# Patient Record
Sex: MALE | Race: ASIAN | NOT HISPANIC OR LATINO | Employment: UNEMPLOYED
[De-identification: names, ages, dates, MRNs, and addresses within clinical notes are randomized per-mention and may not be internally consistent; named-entity substitution may affect disease eponyms.]

---

## 2024-07-27 ENCOUNTER — HEALTH MAINTENANCE LETTER (OUTPATIENT)
Age: 59
End: 2024-07-27

## 2024-09-12 ENCOUNTER — OFFICE VISIT (OUTPATIENT)
Dept: FAMILY MEDICINE | Facility: CLINIC | Age: 59
End: 2024-09-12
Payer: COMMERCIAL

## 2024-09-12 VITALS
HEART RATE: 66 BPM | SYSTOLIC BLOOD PRESSURE: 161 MMHG | BODY MASS INDEX: 29.98 KG/M2 | TEMPERATURE: 97.3 F | WEIGHT: 191 LBS | HEIGHT: 67 IN | RESPIRATION RATE: 16 BRPM | DIASTOLIC BLOOD PRESSURE: 94 MMHG | OXYGEN SATURATION: 100 %

## 2024-09-12 DIAGNOSIS — Z13.1 SCREENING FOR DIABETES MELLITUS: ICD-10-CM

## 2024-09-12 DIAGNOSIS — Z23 NEED FOR SHINGLES VACCINE: ICD-10-CM

## 2024-09-12 DIAGNOSIS — Z00.00 ROUTINE GENERAL MEDICAL EXAMINATION AT A HEALTH CARE FACILITY: Primary | ICD-10-CM

## 2024-09-12 DIAGNOSIS — E66.811 CLASS 1 OBESITY DUE TO EXCESS CALORIES WITHOUT SERIOUS COMORBIDITY WITH BODY MASS INDEX (BMI) OF 30.0 TO 30.9 IN ADULT: ICD-10-CM

## 2024-09-12 DIAGNOSIS — E11.9 TYPE 2 DIABETES MELLITUS WITHOUT COMPLICATION, WITHOUT LONG-TERM CURRENT USE OF INSULIN (H): ICD-10-CM

## 2024-09-12 DIAGNOSIS — Z11.59 NEED FOR HEPATITIS B SCREENING TEST: ICD-10-CM

## 2024-09-12 DIAGNOSIS — Z11.59 NEED FOR HEPATITIS C SCREENING TEST: ICD-10-CM

## 2024-09-12 DIAGNOSIS — Z23 NEED FOR DIPHTHERIA-TETANUS-PERTUSSIS (TDAP) VACCINE: ICD-10-CM

## 2024-09-12 DIAGNOSIS — Z11.4 SCREENING FOR HIV (HUMAN IMMUNODEFICIENCY VIRUS): ICD-10-CM

## 2024-09-12 DIAGNOSIS — Z13.220 SCREENING FOR HYPERLIPIDEMIA: ICD-10-CM

## 2024-09-12 DIAGNOSIS — R74.8 ELEVATED LIVER ENZYMES: ICD-10-CM

## 2024-09-12 DIAGNOSIS — Z76.89 ENCOUNTER TO ESTABLISH CARE: ICD-10-CM

## 2024-09-12 DIAGNOSIS — E66.09 CLASS 1 OBESITY DUE TO EXCESS CALORIES WITHOUT SERIOUS COMORBIDITY WITH BODY MASS INDEX (BMI) OF 30.0 TO 30.9 IN ADULT: ICD-10-CM

## 2024-09-12 DIAGNOSIS — Z12.11 SCREEN FOR COLON CANCER: ICD-10-CM

## 2024-09-12 DIAGNOSIS — R03.0 ELEVATED BP WITHOUT DIAGNOSIS OF HYPERTENSION: ICD-10-CM

## 2024-09-12 LAB
ALBUMIN SERPL BCG-MCNC: 4.7 G/DL (ref 3.5–5.2)
ALP SERPL-CCNC: 78 U/L (ref 40–150)
ALT SERPL W P-5'-P-CCNC: 97 U/L (ref 0–70)
ANION GAP SERPL CALCULATED.3IONS-SCNC: 8 MMOL/L (ref 7–15)
AST SERPL W P-5'-P-CCNC: 65 U/L (ref 0–45)
BILIRUB SERPL-MCNC: 0.6 MG/DL
BUN SERPL-MCNC: 17.2 MG/DL (ref 6–20)
CALCIUM SERPL-MCNC: 9.3 MG/DL (ref 8.8–10.4)
CHLORIDE SERPL-SCNC: 103 MMOL/L (ref 98–107)
CHOLEST SERPL-MCNC: 266 MG/DL
CREAT SERPL-MCNC: 0.88 MG/DL (ref 0.67–1.17)
EGFRCR SERPLBLD CKD-EPI 2021: >90 ML/MIN/1.73M2
FASTING STATUS PATIENT QL REPORTED: YES
FASTING STATUS PATIENT QL REPORTED: YES
GLUCOSE SERPL-MCNC: 126 MG/DL (ref 70–99)
HBA1C MFR BLD: 6.5 % (ref 0–5.6)
HBV SURFACE AB SERPL IA-ACNC: 718 M[IU]/ML
HBV SURFACE AB SERPL IA-ACNC: REACTIVE M[IU]/ML
HCO3 SERPL-SCNC: 28 MMOL/L (ref 22–29)
HCV AB SERPL QL IA: NONREACTIVE
HDLC SERPL-MCNC: 66 MG/DL
HIV 1+2 AB+HIV1 P24 AG SERPL QL IA: NONREACTIVE
LDLC SERPL CALC-MCNC: 174 MG/DL
NONHDLC SERPL-MCNC: 200 MG/DL
POTASSIUM SERPL-SCNC: 4.7 MMOL/L (ref 3.4–5.3)
PROT SERPL-MCNC: 8.1 G/DL (ref 6.4–8.3)
SODIUM SERPL-SCNC: 139 MMOL/L (ref 135–145)
TRIGL SERPL-MCNC: 130 MG/DL

## 2024-09-12 PROCEDURE — 80061 LIPID PANEL: CPT | Performed by: STUDENT IN AN ORGANIZED HEALTH CARE EDUCATION/TRAINING PROGRAM

## 2024-09-12 PROCEDURE — 87389 HIV-1 AG W/HIV-1&-2 AB AG IA: CPT | Performed by: STUDENT IN AN ORGANIZED HEALTH CARE EDUCATION/TRAINING PROGRAM

## 2024-09-12 PROCEDURE — 86706 HEP B SURFACE ANTIBODY: CPT | Performed by: STUDENT IN AN ORGANIZED HEALTH CARE EDUCATION/TRAINING PROGRAM

## 2024-09-12 PROCEDURE — 36415 COLL VENOUS BLD VENIPUNCTURE: CPT | Performed by: STUDENT IN AN ORGANIZED HEALTH CARE EDUCATION/TRAINING PROGRAM

## 2024-09-12 PROCEDURE — 82728 ASSAY OF FERRITIN: CPT | Performed by: STUDENT IN AN ORGANIZED HEALTH CARE EDUCATION/TRAINING PROGRAM

## 2024-09-12 PROCEDURE — 83036 HEMOGLOBIN GLYCOSYLATED A1C: CPT | Performed by: STUDENT IN AN ORGANIZED HEALTH CARE EDUCATION/TRAINING PROGRAM

## 2024-09-12 PROCEDURE — 86803 HEPATITIS C AB TEST: CPT | Performed by: STUDENT IN AN ORGANIZED HEALTH CARE EDUCATION/TRAINING PROGRAM

## 2024-09-12 PROCEDURE — 80053 COMPREHEN METABOLIC PANEL: CPT | Performed by: STUDENT IN AN ORGANIZED HEALTH CARE EDUCATION/TRAINING PROGRAM

## 2024-09-12 PROCEDURE — 90750 HZV VACC RECOMBINANT IM: CPT | Performed by: STUDENT IN AN ORGANIZED HEALTH CARE EDUCATION/TRAINING PROGRAM

## 2024-09-12 PROCEDURE — 99213 OFFICE O/P EST LOW 20 MIN: CPT | Mod: 25 | Performed by: STUDENT IN AN ORGANIZED HEALTH CARE EDUCATION/TRAINING PROGRAM

## 2024-09-12 PROCEDURE — 90472 IMMUNIZATION ADMIN EACH ADD: CPT | Performed by: STUDENT IN AN ORGANIZED HEALTH CARE EDUCATION/TRAINING PROGRAM

## 2024-09-12 PROCEDURE — 90471 IMMUNIZATION ADMIN: CPT | Performed by: STUDENT IN AN ORGANIZED HEALTH CARE EDUCATION/TRAINING PROGRAM

## 2024-09-12 PROCEDURE — 90715 TDAP VACCINE 7 YRS/> IM: CPT | Performed by: STUDENT IN AN ORGANIZED HEALTH CARE EDUCATION/TRAINING PROGRAM

## 2024-09-12 PROCEDURE — 99386 PREV VISIT NEW AGE 40-64: CPT | Mod: 25 | Performed by: STUDENT IN AN ORGANIZED HEALTH CARE EDUCATION/TRAINING PROGRAM

## 2024-09-12 NOTE — PROGRESS NOTES
Preventive Care Visit  [facility]  Jaylin Cintron MD, Family Medicine  Sep 12, 2024      Thiago was seen today for physical.    Diagnoses and all orders for this visit:    Routine general medical examination at a health care facility  Encounter to establish care  -     PRIMARY CARE FOLLOW-UP SCHEDULING; Future  -     REVIEW OF HEALTH MAINTENANCE PROTOCOL ORDERS    Elevated BP without diagnosis of hypertension  Asymptomatic.  Patient has blood pressure cuff at home.  Reports that systolic blood pressures vary from 120s to 140s at home.  Will check amatory blood pressures for the next month and bring to next visit.  -     Comprehensive metabolic panel (BMP + Alb, Alk Phos, ALT, AST, Total. Bili, TP); Future  -     Comprehensive metabolic panel (BMP + Alb, Alk Phos, ALT, AST, Total. Bili, TP)    Class 1 obesity due to excess calories without serious comorbidity with body mass index (BMI) of 30.0 to 30.9 in adult  Discussed Lifestyle modifications, including: Increasing intake of vegetables and fruits, decreasing intake of processed foods/carbohydrates/sugars, having regular physical activity, and losing weight.    Screen for colon cancer  Discussed options, patient would like to do fit test.  -     Fecal colorectal cancer screen (FIT); Future    Screening for HIV (human immunodeficiency virus)  -     HIV Antigen Antibody Combo; Future  -     HIV Antigen Antibody Combo    Need for hepatitis C screening test  -     Hepatitis C Screen Reflex to HCV RNA Quant and Genotype; Future  -     Hepatitis C Screen Reflex to HCV RNA Quant and Genotype    Need for diphtheria-tetanus-pertussis (Tdap) vaccine  -     TDAP 10-64Y (ADACEL,BOOSTRIX)    Screening for hyperlipidemia  -     Lipid panel reflex to direct LDL Non-fasting; Future  -     Lipid panel reflex to direct LDL Non-fasting    Screening for diabetes mellitus  -     Hemoglobin A1c; Future  -     Hemoglobin A1c    Need for shingles vaccine  -     ZOSTER  RECOMBINANT ADJUVANTED (SHINGRIX)    Need for hepatitis B screening test  -     Hepatitis B Surface Antibody; Future  -     Hepatitis B Surface Antibody          Opal Das is a 59 year old, presenting for the following:  Physical (No concerns, est care)         Health Care Directive  Patient does not have a Health Care Directive or Living Will: Discussed advance care planning with patient; information given to patient to review.    HPI        9/12/2024   General Health   How would you rate your overall physical health? Good   Feel stress (tense, anxious, or unable to sleep) Not at all            9/12/2024   Nutrition   Three or more servings of calcium each day? (!) I DON'T KNOW   Diet: Regular (no restrictions)   How many servings of fruit and vegetables per day? (!) 2-3   How many sweetened beverages each day? 0-1            9/12/2024   Exercise   Days per week of moderate/strenous exercise 1 day   Average minutes spent exercising at this level 30 min      (!) EXERCISE CONCERN      9/12/2024   Social Factors   Frequency of gathering with friends or relatives Once a week   Worry food won't last until get money to buy more No   Food not last or not have enough money for food? No   Do you have housing? (Housing is defined as stable permanent housing and does not include staying ouside in a car, in a tent, in an abandoned building, in an overnight shelter, or couch-surfing.) Yes   Are you worried about losing your housing? No   Lack of transportation? No   Unable to get utilities (heat,electricity)? No            9/12/2024   Fall Risk   Fallen 2 or more times in the past year? No   Trouble with walking or balance? No             9/12/2024   Dental   Dentist two times every year? (!) NO            9/12/2024   TB Screening   Were you born outside of the [place]? Yes            Today's PHQ-2 Score:       9/12/2024     8:42 AM   PHQ-2 ( 1999 Pfizer)   Q1: Little interest or pleasure in doing things 0   Q2: Feeling  "down, depressed or hopeless 0   PHQ-2 Score 0   Q1: Little interest or pleasure in doing things Not at all   Q2: Feeling down, depressed or hopeless Not at all   PHQ-2 Score 0           9/12/2024   Substance Use   Alcohol more than 3/day or more than 7/wk No   Do you use any other substances recreationally? No        Social History     Tobacco Use    Smoking status: Never     Passive exposure: Never    Smokeless tobacco: Never   Vaping Use    Vaping status: Never Used             9/12/2024   One time HIV Screening   Previous HIV test? No          9/12/2024   STI Screening   New sexual partner(s) since last STI/HIV test? No      Last PSA: No results found for: \"PSA\"  ASCVD Risk   The ASCVD Risk score (Osman MENJIVAR, et al., 2019) failed to calculate for the following reasons:    Cannot find a previous HDL lab    Cannot find a previous total cholesterol lab          Reviewed and updated as needed this visit by Provider                             Objective    Exam  BP (!) 161/94   Pulse 66   Temp 97.3  F (36.3  C) (Temporal)   Resp 16   Ht 1.69 m (5' 6.54\")   Wt 86.6 kg (191 lb)   SpO2 100%   BMI 30.33 kg/m     Estimated body mass index is 30.33 kg/m  as calculated from the following:    Height as of this encounter: 1.69 m (5' 6.54\").    Weight as of this encounter: 86.6 kg (191 lb).    Physical Exam  General Appearance:  Alert, cooperative, no distress, appears stated age.  Head:  Normocephalic, without obvious abnormality, atraumatic.  Eyes:  Conjunctivae/corneas clear, extraocular movements intact both eyes.  Lungs:  Clear to auscultation bilaterally, respirations unlabored.  Heart:  Regular rate and rhythm, S1 and S2 normal, no murmur, rub or gallop.  Abdomen:  Soft, non-tender, bowel sounds active all four quadrants.   Extremities:  Atraumatic, no cyanosis or edema.  Skin:  Skin color, texture, turgor normal, no rashes or lesions.  Neurologic: No focal deficits.          Signed Electronically by: Jaylin" MD Abdulaziz    Prior to immunization administration, verified patients identity using patient s name and date of birth. Please see Immunization Activity for additional information.     Screening Questionnaire for Adult Immunization    Are you sick today?   No   Do you have allergies to medications, food, a vaccine component or latex?   No   Have you ever had a serious reaction after receiving a vaccination?   No   Do you have a long-term health problem with heart, lung, kidney, or metabolic disease (e.g., diabetes), asthma, a blood disorder, no spleen, complement component deficiency, a cochlear implant, or a spinal fluid leak?  Are you on long-term aspirin therapy?   No   Do you have cancer, leukemia, HIV/AIDS, or any other immune system problem?   No   Do you have a parent, brother, or sister with an immune system problem?   No   In the past 3 months, have you taken medications that affect  your immune system, such as prednisone, other steroids, or anticancer drugs; drugs for the treatment of rheumatoid arthritis, Crohn s disease, or psoriasis; or have you had radiation treatments?   No   Have you had a seizure, or a brain or other nervous system problem?   No   During the past year, have you received a transfusion of blood or blood    products, or been given immune (gamma) globulin or antiviral drug?   No   For women: Are you pregnant or is there a chance you could become       pregnant during the next month?   No   Have you received any vaccinations in the past 4 weeks?   No     Immunization questionnaire answers were all negative.      Patient instructed to remain in clinic for 15 minutes afterwards, and to report any adverse reactions.     Screening performed by Ross Tobias MA on 9/12/2024 at 8:51 AM.

## 2024-09-12 NOTE — PATIENT INSTRUCTIONS
"Patient Education   Sharon Nichelle Xeeb Saib Xyuas Kom Tiv Thaiv Tus Kheej  Nov yog ib co sharon nichelle xeeb uas peb yeej meem muab david tib neeg pab lawv noj qab nyob zoo. Tony zaum koj pab pawg saib xyuas yuav muaj ib co sharon nichelle xeeb uas tshwj xeeb david koj nkaus xwb. Thov nrog koj pab pawg saib xyuas sib june txog tony yam uas koj toob rojas kom tiv thaiv koj tus kheej.  Lukas Coj Lub Neej  Es xaws xais ntau edmar 150 feeb txhua lub moffett tiam (30 feeb txhua hnub, 5 hnub ib lub moffett tiam).  Ua yam pab cov leeg muaj zog tuaj 2 zaug txhua lub moffett tiam. Tony yam no pab koj tswj hwm koj lub cev qhov hnyav thiab tiv thaiv ntawm kab mob.  Txhob haus luam yeeb.  Pleev tshuaj tiv thaiv tshav kub kom thiaj tsis raug mob khees xaws ntawm nqaij tawv.  Txhua 2 mus david 5 xyoos yuav tau kuaj koj lub tsev david qhov radon. Radon yog ib rozina vicky uas tsis muaj xim tsis muaj ntxhiab uas mob tau koj cov ntsws. Kom thiaj kawm ntxiv, mus saib [email or web address] thiab ntaus ntawv \"Radon in Homes.\"  Ceev cov phom kom tsis muaj mos txwv david hauv thiab muab xauv porfirio hauv ib qho chaw nyab xeeb xws li lub txhoj, los yog muab xauv porfirio thiab muab cov yawm sij zais porfirio. Muab cov mos txwv xauv david hauv lwm qhov chaw nrug cov phom. Kom thiaj kawm ntxiv, mus saib [email or web address] thiab ntaus ntawv \"safe gun storage.\"  Lukas Noj Qab Huv  Noj 5 qho txiv hmab txiv ntoo thiab zaub txhua hnub.  Noj nplem nplej, txhuv xim av thiab cov fawm muaj nplej (los theej nplem dawb, txhuv dawb, thiab fawm dawb).  Ua tib zoo noj calcium thiab vitamin D ntau. Saib cov ntawv lo david pob khoom noj thiab siv zog noj kom txog 100% RDA (qhov ntau hauv ib hnub).  Yeej meem kuaj ntsuas  Txhua 6 lub hli mus kuaj hniav thiab muab tu.  Txhua xyoo mus saib koj pab pawg saib xyuas lukas noj qab nyob zoo kom sib june txog:  Ib yam dab tsi uas hloov ntawm koj txoj lukas noj qab nyob zoo.  Cov tshuaj uas koj pab pawg tau hais kom siv.  Lukas saib xyuas kom tiv thaiv, lukas npaj david tsev neeg, thiab yuav ua li chrissie tiv " thaiv ntawm kab mob uas ntev.  Lukas txhaj tshuaj (koob tshuaj tiv thaiv)   Cov koob tshuaj HPV (txog thaum muaj 26 xyoo), yog koj yeej tsis tau txais edmar li.  Cov koob tshuaj Hepatitis B Kab Mob Siab (txog thaum muaj 61 xyoos), yog koj yeej tsis tau txais edmar li.  Koob tshuaj COVID-19: Txais koob tshuaj no thaum txog caij txais.  Koob tshuaj tiv thaiv ntawm mob khaub thuas: Txais txhua xyoo.  Koob tshuaj tiv thaiv mob daig tsaig: Txais txhua 10 xyoo.  Pneumococcal, hepatitis A, thiab RSV cov koob tshuaj: Nug koj pab pawg saib xyuas farhad puas tsim nyog david koj txais cov no raws li koj qhov pheej hmoo.  Koob tshuaj tiv thaiv ntawm kab mob sawv hlwv (david cov muaj 50 xyoo rov giorgio).  Lukas kuaj ntsuas david lukas noj qab nyob zoo  Kuaj mob ntshav qab zib:  Pib thaum muaj 35 xyoos, Mus kuaj mob ntshav qab zib txhua 3 xyoos los yog ntau zog.  Yog koj tseem tsis tau txog 37 xyoos, nug koj pab pawg saib xyuas farhad puas tsim nyog david koj kuaj mob ntshav qab zib.  Kuaj cholesterol: Thaum muaj 39 xyoo, pib kuaj cholesterol txhua 5 xyoos, los yog ntau edmar ntawd yog kws nohelia mob qhia.  Kuaj txha qhov tuab (DEXA): Thaum txog 50 xyoo lawd, nug koj pab pawg saib xyuas farhad puas tsim nyog david koj kuaj txha farhad puas ruaj.  Kab Mob Siab Hepatitis C: Kuaj ib zaug hauv koj lub neej.  Kuaj Txoj Hlab Ntshav Hauv Plab: Nrog koj tus kws nohelia mob june txog lukas kuaj ntsuas no yog koj:  Tau haus luam yeeb ib zaug li; thiab  Yog txiv neej; thiab  Nruab hnub nyoog 65 thiab 75.  Mob Rojas Cees (kis mob dhau ntawm lukas sib deev)  Ua ntej muaj 24 xyoos: Nug koj pab pawg saib xyuas farhad puas tsim nyog kuaj david mob rojas cees.  Blu qab muaj 24 xyoos: Mus kuaj david mob rojas cees yog koj muaj lukas pheej hmoo. Koj muaj lukas pheej hmoo david mob rojas cees (suav nrog HIV) yog:  Koj sib deev nrog ntau edmar ib tug neeg.  Koj tsis siv cov hnab looj qau thaum sib deev.  Koj los yog koj tus khub deev kuaj pom tias muaj mob rojas cees lawm.  Yog koj muaj lukas pheej hmoo david mob rojas cees  HIV, nug txog cov tshuaj PrEP kom tiv thaiv ntawm HIV.  Mus kuaj david mob rojas cees HIV yam ntau edmar ib zaug hauv koj lub neej, txawm yog koj muaj lukas pheej hmoo david mob rojas cees HIV los tsis muaj los xij.  Kuaj david mob khees xaws  Kuaj lub ncauj tsev me nyuam david mob khees xaws: Yog koj muaj ib lub ncauj tsev me nyuam, aaron yuav tau ib sij kuaj lub ncauj tsev me nyuam seb puas muaj mob khees xaws pib thaum muaj 21 xyoos. Neeg feem coob uas ib sij kuaj lub ncauj tsev me nyuam thiab tsis pom dab tsi txawv lawv tsum tau solo qab muaj 65 xyoos. Nrog koj tus kws nohelia mob sib june txog qhov no.  Kuaj lub mis david mob khees xaws (mammogram): Yog koj tau muaj mis edmar li, yuav tau ib sij kuaj lub mis pib thaum muaj 40 xyoo. Lukas kuaj no yog kom saib seb puas muaj mob khees xaws hauv lub mis.  Kuaj Txoj Hnyuv David Mob Khees Xaws: Yeej tseem ceeb pib kuaj txoj hnyuv david mob khees xaws pib thaum muaj 45 xyoos.  Txhua 10 xyoo yuav tau kuaj txoj hnyuv (los yog ntau zog yog koj muaj lukas pheej hmoo) Los sis, nug koj tus kws nohelia mob txog lukas kuaj thooj quav FIT txhua xyoo los yog Cologuard txhua 3 xyoos.  Kom thiaj kawm ntxiv txog tony rozina kuaj no, mus saib: [email or web address].  Yog xav tau lukas pab txog qhov no, mus saib: [email or web address].[email or web address].  Kuaj lub christian kua phev (prostate) david mob khees xaws: Yog koj muaj ib lub christian kua phev (prostate) thiab nruab hnub nyoog 57 mus david 69, nug koj tus kws nohelia mob farhad puas tsim nyog david koj kuaj lub christian kua phev.  Kuaj ntsws david mob khees xaws: Yog koj haus luam yeeb parrish sim no los yog tau haus yav tas los uas muaj hnub nyoog nruab 50 xyoos mus david 80 xyoo, nug koj pab pawg saib xyuas farhad puas tsim nyog david koj yeej meem kuaj lub ntsws david mob khees xaws.    David cov rozina phiaj lukas siv ua ntaub ntawv qhia nkaus xwb. Yuav tsis pauv lukas qhia los ntawm koj qhov chaw nohelia mob. Copyright (harvey mitchell)   2023 [facility].   Txhua txoj paula raug tswj tseg lawm. Tshaj xyuas los ntawm [facility]. [employer] [id number] - REV 04/24.

## 2024-09-16 ENCOUNTER — LAB (OUTPATIENT)
Dept: LAB | Facility: CLINIC | Age: 59
End: 2024-09-16
Payer: COMMERCIAL

## 2024-09-16 DIAGNOSIS — Z12.11 SCREEN FOR COLON CANCER: ICD-10-CM

## 2024-09-16 PROBLEM — E78.2 MIXED HYPERLIPIDEMIA: Status: ACTIVE | Noted: 2024-09-16

## 2024-09-16 LAB — FERRITIN SERPL-MCNC: 773 NG/ML (ref 31–409)

## 2024-09-16 PROCEDURE — 82274 ASSAY TEST FOR BLOOD FECAL: CPT

## 2024-09-17 ENCOUNTER — APPOINTMENT (OUTPATIENT)
Dept: INTERPRETER SERVICES | Facility: CLINIC | Age: 59
End: 2024-09-17
Payer: COMMERCIAL

## 2024-09-17 ENCOUNTER — TELEPHONE (OUTPATIENT)
Dept: FAMILY MEDICINE | Facility: CLINIC | Age: 59
End: 2024-09-17
Payer: COMMERCIAL

## 2024-09-17 DIAGNOSIS — R74.8 ELEVATED LIVER ENZYMES: Primary | ICD-10-CM

## 2024-09-17 DIAGNOSIS — E78.5 HYPERLIPIDEMIA, UNSPECIFIED HYPERLIPIDEMIA TYPE: ICD-10-CM

## 2024-09-17 NOTE — TELEPHONE ENCOUNTER
Called patient and relayed provider message.   Patient agrees to starting cholesterol medication, low-dose aspirin, liver US.  Patient bought a BP monitor, has been taking blood pressure at home.  Readings are 120s-150s/90s-100s  Discussed proper technique--patient will implement this, continue recording daily BP and bring to 10/11 appt with Dr. Cintron    Triage - Please call to notify patient when medications have been sent. OK to [facility] at [phone].    [facility] : Ana Calloway RN  [facility]

## 2024-09-17 NOTE — TELEPHONE ENCOUNTER
----- Message from Jaylinamarilys Cintron sent at 9/16/2024  6:07 PM CDT -----  Your cholesterol levels are high.  Your risk of having a heart attack or stroke in the next 10 years is 19%, which is very high.  I recommend that we start you on a daily cholesterol medication and baby aspirin.  You can lower your risk of heart attack or stroke in the future by having better control of your blood pressure and keeping your diabetes well-controlled.    Diabetes is well-controlled.    Electrolytes and kidney function are normal.  Your liver enzymes are a little high.  I recommend we do an abdominal ultrasound to look at your liver.    Screening for hepatitis B shows that you are immune.  This may be due to vaccination in the past or previous infection that has recovered.    Screening for HIV and hepatitis C were all negative.

## 2024-09-19 LAB — HEMOCCULT STL QL IA: NEGATIVE

## 2024-09-26 RX ORDER — ASPIRIN 81 MG/1
81 TABLET ORAL DAILY
Qty: 90 TABLET | Refills: 3 | Status: SHIPPED | OUTPATIENT
Start: 2024-09-26

## 2024-09-26 RX ORDER — ATORVASTATIN CALCIUM 40 MG/1
40 TABLET, FILM COATED ORAL DAILY
Qty: 90 TABLET | Refills: 3 | Status: SHIPPED | OUTPATIENT
Start: 2024-09-26

## 2024-09-26 NOTE — TELEPHONE ENCOUNTER
Liver ultrasound, aspirin, statin ordered.  Please call to notify patient.  He should get a call in 1 or 2 days to schedule ultrasound.

## 2024-09-27 NOTE — TELEPHONE ENCOUNTER
[place] [facility] [place] Relayed clinician's message below. Pt verbalized understanding and in agreement with plan.    Lefty BECKFORD RN  [facility]

## 2024-10-11 ENCOUNTER — HOSPITAL ENCOUNTER (OUTPATIENT)
Dept: ULTRASOUND IMAGING | Facility: HOSPITAL | Age: 59
Discharge: HOME | End: 2024-10-11
Attending: STUDENT IN AN ORGANIZED HEALTH CARE EDUCATION/TRAINING PROGRAM | Admitting: STUDENT IN AN ORGANIZED HEALTH CARE EDUCATION/TRAINING PROGRAM
Payer: COMMERCIAL

## 2024-10-11 ENCOUNTER — OFFICE VISIT (OUTPATIENT)
Dept: FAMILY MEDICINE | Facility: CLINIC | Age: 59
End: 2024-10-11
Payer: COMMERCIAL

## 2024-10-11 VITALS
HEART RATE: 65 BPM | TEMPERATURE: 98 F | OXYGEN SATURATION: 100 % | DIASTOLIC BLOOD PRESSURE: 96 MMHG | SYSTOLIC BLOOD PRESSURE: 156 MMHG | WEIGHT: 195 LBS | RESPIRATION RATE: 16 BRPM | BODY MASS INDEX: 32.49 KG/M2 | HEIGHT: 65 IN

## 2024-10-11 DIAGNOSIS — R74.8 ELEVATED LIVER ENZYMES: ICD-10-CM

## 2024-10-11 DIAGNOSIS — R03.0 ELEVATED BP WITHOUT DIAGNOSIS OF HYPERTENSION: Primary | ICD-10-CM

## 2024-10-11 DIAGNOSIS — Z23 NEED FOR COVID-19 VACCINE: ICD-10-CM

## 2024-10-11 DIAGNOSIS — Z23 NEEDS FLU SHOT: ICD-10-CM

## 2024-10-11 DIAGNOSIS — Z23 NEED FOR PROPHYLACTIC VACCINATION AGAINST STREPTOCOCCUS PNEUMONIAE (PNEUMOCOCCUS): ICD-10-CM

## 2024-10-11 DIAGNOSIS — E78.2 MIXED HYPERLIPIDEMIA: ICD-10-CM

## 2024-10-11 DIAGNOSIS — E11.9 TYPE 2 DIABETES MELLITUS WITHOUT COMPLICATION, WITHOUT LONG-TERM CURRENT USE OF INSULIN (H): ICD-10-CM

## 2024-10-11 DIAGNOSIS — Z11.59 NEED FOR HEPATITIS B SCREENING TEST: ICD-10-CM

## 2024-10-11 LAB
CREAT UR-MCNC: 94.4 MG/DL
HBV CORE AB SERPL QL IA: REACTIVE
HBV SURFACE AB SERPL IA-ACNC: 631 M[IU]/ML
HBV SURFACE AB SERPL IA-ACNC: REACTIVE M[IU]/ML
MICROALBUMIN UR-MCNC: 42.5 MG/L
MICROALBUMIN/CREAT UR: 45.02 MG/G CR (ref 0–17)

## 2024-10-11 PROCEDURE — 76705 ECHO EXAM OF ABDOMEN: CPT

## 2024-10-11 PROCEDURE — 82570 ASSAY OF URINE CREATININE: CPT | Performed by: STUDENT IN AN ORGANIZED HEALTH CARE EDUCATION/TRAINING PROGRAM

## 2024-10-11 PROCEDURE — 86706 HEP B SURFACE ANTIBODY: CPT | Performed by: STUDENT IN AN ORGANIZED HEALTH CARE EDUCATION/TRAINING PROGRAM

## 2024-10-11 PROCEDURE — 86704 HEP B CORE ANTIBODY TOTAL: CPT | Performed by: STUDENT IN AN ORGANIZED HEALTH CARE EDUCATION/TRAINING PROGRAM

## 2024-10-11 PROCEDURE — 90480 ADMN SARSCOV2 VAC 1/ONLY CMP: CPT | Performed by: STUDENT IN AN ORGANIZED HEALTH CARE EDUCATION/TRAINING PROGRAM

## 2024-10-11 PROCEDURE — 99214 OFFICE O/P EST MOD 30 MIN: CPT | Performed by: STUDENT IN AN ORGANIZED HEALTH CARE EDUCATION/TRAINING PROGRAM

## 2024-10-11 PROCEDURE — G2211 COMPLEX E/M VISIT ADD ON: HCPCS | Performed by: STUDENT IN AN ORGANIZED HEALTH CARE EDUCATION/TRAINING PROGRAM

## 2024-10-11 PROCEDURE — 36415 COLL VENOUS BLD VENIPUNCTURE: CPT | Performed by: STUDENT IN AN ORGANIZED HEALTH CARE EDUCATION/TRAINING PROGRAM

## 2024-10-11 PROCEDURE — 82043 UR ALBUMIN QUANTITATIVE: CPT | Performed by: STUDENT IN AN ORGANIZED HEALTH CARE EDUCATION/TRAINING PROGRAM

## 2024-10-11 PROCEDURE — 91320 SARSCV2 VAC 30MCG TRS-SUC IM: CPT | Performed by: STUDENT IN AN ORGANIZED HEALTH CARE EDUCATION/TRAINING PROGRAM

## 2024-10-11 PROCEDURE — 87517 HEPATITIS B DNA QUANT: CPT | Performed by: STUDENT IN AN ORGANIZED HEALTH CARE EDUCATION/TRAINING PROGRAM

## 2024-10-11 NOTE — PROGRESS NOTES
Thiago was seen today for hypertension.    Diagnoses and all orders for this visit:    Elevated BP without diagnosis of hypertension  Comments:  BP elevated again, ambulatory BPs WNL.  Will have patient RTC for RN visit with ambulatory readings and machine for recheck.    Type 2 diabetes mellitus without complication, without long-term current use of insulin (H)  Comments:  Newly diagnosed.  Start metformin.  Refer to diabetes educator.  Recheck 3 months.  Orders:  -     Albumin Random Urine Quantitative with Creat Ratio; Future  -     OPTOMETRY REFERRAL; Future  -     metFORMIN (GLUCOPHAGE) 500 MG tablet; Take 2 tablets (1,000 mg) by mouth 2 times daily (with meals).  -     Adult Diabetes Education  Referral; Future  -     Albumin Random Urine Quantitative with Creat Ratio    Mixed hyperlipidemia  Comments:  Statin started 9/2024, recheck 12/2024.    Need for hepatitis B screening test  Comments:  Hep B antibody positive.  Unclear if this is from immunization or prior infection.  Will check hep B core antibody and hep B virus DNA.  Orders:  -     Hepatitis B core antibody; Future  -     Hep B Virus DNA Quant Real Time PCR; Future  -     Hepatitis B core antibody  -     Hep B Virus DNA Quant Real Time PCR    Elevated liver enzymes  Comments:  Elevated liver enzymes, positive hep B surface, check DNA and core, ultrasound ordered.  Not completed yet.  Hep C negative.  Ferritin high at 773.    Need for COVID-19 vaccine  -     COVID-19 12+ (PFIZER)    Needs flu shot  Need for prophylactic vaccination against Streptococcus pneumoniae (pneumococcus)  Comments:  Discussed, declined.      The longitudinal plan of care for the diagnosis(es)/condition(s) as documented were addressed during this visit. Due to the added complexity in care, I will continue to support Thiago in the subsequent management and with ongoing continuity of care.      The longitudinal plan of care for the diagnosis(es)/condition(s) as documented  "were addressed during this visit. Due to the added complexity in care, I will continue to support Thiago in the subsequent management and with ongoing continuity of care.        Subjective   Thiago is a 59 year old, presenting for the following health issues:  Hypertension        10/11/2024    11:06 AM   Additional Questions   Roomed by JOSE R CONTEH   Accompanied by SELF     History of Present Illness       Hypertension: He presents for follow up of hypertension.  He does check blood pressure  regularly outside of the clinic. Outside blood pressures have been over 140/90. He does not follow a low salt diet.     He eats 2-3 servings of fruits and vegetables daily.He consumes 1 sweetened beverage(s) daily.He exercises with enough effort to increase his heart rate 30 to 60 minutes per day.  He exercises with enough effort to increase his heart rate 4 days per week.   He is taking medications regularly.    HTN  BP Readings from Last 3 Encounters:   10/11/24 (!) 156/96   09/12/24 (!) 161/94   Not on medication  Reports that ambulatory Bps have been normal.     DM2 -newly diagnosed  Lab Results   Component Value Date    A1C 6.5 09/12/2024   Not on medication    HLD  Statin started 9/2024, recheck 12/2024.  The 10-year ASCVD risk score (Osman DK, et al., 2019) is: 18.5%    Values used to calculate the score:      Age: 59 years      Sex: Male      Is Non- : No      Diabetic: Yes      Tobacco smoker: No      Systolic Blood Pressure: 156 mmHg      Is BP treated: No      HDL Cholesterol: 66 mg/dL      Total Cholesterol: 266 mg/dL            Objective    BP (!) 156/96 (BP Location: Left arm, Patient Position: Sitting, Cuff Size: Adult Regular)   Pulse 65   Temp 98  F (36.7  C) (Oral)   Resp 16   Ht 1.65 m (5' 4.96\")   Wt 88.5 kg (195 lb)   SpO2 100%   BMI 32.49 kg/m    Body mass index is 32.49 kg/m .  Physical Exam   General: Well developed, well nourished.  Skin:  Dry without rash.    Head:  " Normocephalic-atraumatic.    Eye:  Normal conjunctivae.     Respiratory:  Normal respiratory effort.   Gastrointestinal:  Non-distended.    Musculoskeletal:  No deformity or edema.  Neurologic: No focal deficits.          Signed Electronically by: Jaylin Cintron MD Prior to immunization administration, verified patients identity using patient s name and date of birth. Please see Immunization Activity for additional information.     Screening Questionnaire for Adult Immunization    Are you sick today?   No   Do you have allergies to medications, food, a vaccine component or latex?   No   Have you ever had a serious reaction after receiving a vaccination?   No   Do you have a long-term health problem with heart, lung, kidney, or metabolic disease (e.g., diabetes), asthma, a blood disorder, no spleen, complement component deficiency, a cochlear implant, or a spinal fluid leak?  Are you on long-term aspirin therapy?   No   Do you have cancer, leukemia, HIV/AIDS, or any other immune system problem?   No   Do you have a parent, brother, or sister with an immune system problem?   No   In the past 3 months, have you taken medications that affect  your immune system, such as prednisone, other steroids, or anticancer drugs; drugs for the treatment of rheumatoid arthritis, Crohn s disease, or psoriasis; or have you had radiation treatments?   No   Have you had a seizure, or a brain or other nervous system problem?   No   During the past year, have you received a transfusion of blood or blood    products, or been given immune (gamma) globulin or antiviral drug?   No   For women: Are you pregnant or is there a chance you could become       pregnant during the next month?   No   Have you received any vaccinations in the past 4 weeks?   Yes     Immunization questionnaire was positive for at least one answer.  Notified       Patient instructed to remain in clinic for 15 minutes afterwards, and to report any adverse reactions.      Screening performed by Ethan Vee MA on 10/11/2024 at 11:10 AM.

## 2024-10-14 ENCOUNTER — TELEPHONE (OUTPATIENT)
Dept: FAMILY MEDICINE | Facility: CLINIC | Age: 59
End: 2024-10-14
Payer: COMMERCIAL

## 2024-10-14 LAB — HBV DNA SERPL NAA+PROBE-ACNC: NOT DETECTED IU/ML

## 2024-10-14 NOTE — TELEPHONE ENCOUNTER
----- Message from Jaylin Cintron sent at 10/14/2024  3:19 PM CDT -----  This does show that you are leaking some protein into your urine, however will need to recheck this to confirm.    Hepatitis B test showed that you likely were previously infected with hepatitis B, however you have cleared it and are now immune

## 2024-10-15 ENCOUNTER — TELEPHONE (OUTPATIENT)
Dept: FAMILY MEDICINE | Facility: CLINIC | Age: 59
End: 2024-10-15
Payer: COMMERCIAL

## 2024-10-15 NOTE — TELEPHONE ENCOUNTER
Patient does have a 10/14 results TE as well.     ----- Message from Jaylin Cintron sent at 10/12/2024 10:06 AM CDT -----  Ultrasound of your liver shows fatty liver.  This is usually caused by lifestyle.  I recommend lifestyle modifications including: Avoiding alcohol, Increasing intake of vegetables and fruits, decreasing intake of processed foods/carbohydrates/sugars, having regular physical activity, and losing weight.         ----- Message from Jaylin Cintron sent at 10/14/2024  3:19 PM CDT -----  This does show that you are leaking some protein into your urine, however will need to recheck this to confirm.     Hepatitis B test showed that you likely were previously infected with hepatitis B, however you have cleared it and are now immune             Called patient on the phone to relay test results and recommendation above.  Patient verbalized understood.  Has upcoming BP follow up with RN. If needed to, will stop by lab to leave test samples.    Vin Rodriguez RN  [facility]

## 2024-10-16 NOTE — TELEPHONE ENCOUNTER
Called and spoke to patient relayed message below. Patient verbalizes understanding.    Patient would like to talk about his US result. Patient scheduled virtual visit with pcp on 11/1/2024 @ 4 pm.

## 2024-11-01 PROBLEM — R80.9 POSITIVE FOR MICROALBUMINURIA: Status: ACTIVE | Noted: 2024-11-01

## 2024-11-01 PROBLEM — Z78.9 HEPATITIS B IMMUNE: Status: ACTIVE | Noted: 2024-11-01

## 2024-11-15 ENCOUNTER — ALLIED HEALTH/NURSE VISIT (OUTPATIENT)
Dept: FAMILY MEDICINE | Facility: CLINIC | Age: 59
End: 2024-11-15
Payer: COMMERCIAL

## 2024-11-15 ENCOUNTER — LAB (OUTPATIENT)
Dept: LAB | Facility: CLINIC | Age: 59
End: 2024-11-15
Payer: COMMERCIAL

## 2024-11-15 VITALS — SYSTOLIC BLOOD PRESSURE: 158 MMHG | HEART RATE: 60 BPM | DIASTOLIC BLOOD PRESSURE: 93 MMHG

## 2024-11-15 DIAGNOSIS — R80.9 POSITIVE FOR MICROALBUMINURIA: ICD-10-CM

## 2024-11-15 DIAGNOSIS — R03.0 ELEVATED BP WITHOUT DIAGNOSIS OF HYPERTENSION: Primary | ICD-10-CM

## 2024-11-15 PROCEDURE — 99207 PR NO CHARGE NURSE ONLY: CPT

## 2024-11-15 PROCEDURE — 82570 ASSAY OF URINE CREATININE: CPT

## 2024-11-15 PROCEDURE — 82043 UR ALBUMIN QUANTITATIVE: CPT

## 2024-11-15 NOTE — PROGRESS NOTES
Blood pressures continue to be high.  I think we should start a blood pressure medication.  If you are okay with this, I can send it to your pharmacy.

## 2024-11-15 NOTE — PROGRESS NOTES
I met with Thiago Garzon at the request of Jaylin Cintron MD to recheck his blood pressure.  Blood pressure medications on the med list were reviewed with patient.      Patient has taken all medications as per usual regimen: NA  Patient reports tolerating them without any issues or concerns: NA    Patient brought his home BP monitor to clinic for comparison. Home BP monitor yielded comparable results with clinic BP monitor.     Patient reports ambulatory readings average 150s/100s--BP log reviewed by RN to confirm this  Highest home BP readin/111    Patient is feeling well today. Denies sx of HTN including headache, dizziness, chest pain, palpitations, etc.    Preferred pharmacy confirmed.     Vitals:    11/15/24 1355   BP: (!) 168/94   BP Location: Left arm   Patient Position: Sitting   Cuff Size: Adult Regular   Pulse: 62       Blood pressure was taken, previous encounter was reviewed, patient was discharged and encounter will be sent to PCP for review.     Angela Fleming RN BSN  [facility]

## 2024-11-16 LAB
CREAT UR-MCNC: 26.3 MG/DL
MICROALBUMIN UR-MCNC: <12 MG/L
MICROALBUMIN/CREAT UR: NORMAL MG/G{CREAT}

## 2024-12-04 ENCOUNTER — MYC MEDICAL ADVICE (OUTPATIENT)
Dept: FAMILY MEDICINE | Facility: CLINIC | Age: 59
End: 2024-12-04
Payer: COMMERCIAL

## 2024-12-04 NOTE — TELEPHONE ENCOUNTER
Patient returned call. Pt agreed to start a BP medication.    Lefty BECKFORD RN  [facility]

## 2024-12-04 NOTE — TELEPHONE ENCOUNTER
Writer replied to patient via [employer].     Lefty BECKFORD RN  [facility]

## 2024-12-14 ENCOUNTER — HEALTH MAINTENANCE LETTER (OUTPATIENT)
Age: 59
End: 2024-12-14

## 2025-01-17 PROBLEM — R74.8 ELEVATED LIVER ENZYMES: Status: ACTIVE | Noted: 2024-10-11

## 2025-01-17 PROBLEM — K76.0 HEPATIC STEATOSIS: Status: ACTIVE | Noted: 2025-01-17

## 2025-01-17 PROBLEM — I10 PRIMARY HYPERTENSION: Status: ACTIVE | Noted: 2024-09-12

## 2025-01-17 PROBLEM — R80.9 POSITIVE FOR MICROALBUMINURIA: Status: ACTIVE | Noted: 2024-11-01

## 2025-02-06 ENCOUNTER — TELEPHONE (OUTPATIENT)
Dept: FAMILY MEDICINE | Facility: CLINIC | Age: 60
End: 2025-02-06
Payer: COMMERCIAL

## 2025-02-06 NOTE — TELEPHONE ENCOUNTER
----- Message from Jaylin Cintron sent at 2/5/2025  3:13 PM CST -----  Cholesterol levels have improved. Continue medication.     Electrolytes and kidney function are normal.    Diabetes remains well controlled.     Iron levels are normal.

## 2025-02-20 PROBLEM — E78.5 HYPERLIPIDEMIA LDL GOAL <70: Status: ACTIVE | Noted: 2024-09-16

## 2025-02-20 PROBLEM — Z78.9 HEPATITIS B IMMUNE: Status: ACTIVE | Noted: 2024-11-01

## 2025-04-27 ENCOUNTER — HEALTH MAINTENANCE LETTER (OUTPATIENT)
Age: 60
End: 2025-04-27

## 2025-08-10 ENCOUNTER — HEALTH MAINTENANCE LETTER (OUTPATIENT)
Age: 60
End: 2025-08-10

## 2025-08-20 ENCOUNTER — PATIENT OUTREACH (OUTPATIENT)
Dept: CARE COORDINATION | Facility: CLINIC | Age: 60
End: 2025-08-20

## 2025-08-21 DIAGNOSIS — Z12.11 COLON CANCER SCREENING: ICD-10-CM
